# Patient Record
Sex: FEMALE | Race: BLACK OR AFRICAN AMERICAN
[De-identification: names, ages, dates, MRNs, and addresses within clinical notes are randomized per-mention and may not be internally consistent; named-entity substitution may affect disease eponyms.]

---

## 2018-05-23 ENCOUNTER — HOSPITAL ENCOUNTER (EMERGENCY)
Dept: HOSPITAL 74 - JER | Age: 23
Discharge: HOME | End: 2018-05-23
Payer: COMMERCIAL

## 2018-05-23 VITALS — HEART RATE: 119 BPM | TEMPERATURE: 98.5 F | DIASTOLIC BLOOD PRESSURE: 81 MMHG | SYSTOLIC BLOOD PRESSURE: 129 MMHG

## 2018-05-23 VITALS — BODY MASS INDEX: 39.1 KG/M2

## 2018-05-23 DIAGNOSIS — J40: Primary | ICD-10-CM

## 2018-05-23 PROCEDURE — 3E0F7GC INTRODUCTION OF OTHER THERAPEUTIC SUBSTANCE INTO RESPIRATORY TRACT, VIA NATURAL OR ARTIFICIAL OPENING: ICD-10-PCS

## 2018-05-23 RX ADMIN — IPRATROPIUM BROMIDE AND ALBUTEROL SULFATE SCH AMP: .5; 3 SOLUTION RESPIRATORY (INHALATION) at 22:40

## 2018-05-23 RX ADMIN — IPRATROPIUM BROMIDE AND ALBUTEROL SULFATE SCH AMP: .5; 3 SOLUTION RESPIRATORY (INHALATION) at 21:54

## 2018-05-23 RX ADMIN — IPRATROPIUM BROMIDE AND ALBUTEROL SULFATE SCH: .5; 3 SOLUTION RESPIRATORY (INHALATION) at 22:53

## 2018-05-23 RX ADMIN — IPRATROPIUM BROMIDE AND ALBUTEROL SULFATE SCH AMP: .5; 3 SOLUTION RESPIRATORY (INHALATION) at 22:17

## 2018-05-23 NOTE — PDOC
History of Present Illness





- General


Chief Complaint: Cold Symptoms


Stated Complaint: COUGHING/FEVER


Time Seen by Provider: 05/23/18 21:06





- History of Present Illness


Initial Comments: 


22-year-old healthy female without any significant past medical history 

presents for evaluation of sore throat cough and fever 3 days.


05/23/18 21:48








Past History





- Past Medical History


Allergies/Adverse Reactions: 


 Allergies











Allergy/AdvReac Type Severity Reaction Status Date / Time


 


No Known Allergies Allergy   Verified 05/23/18 21:13














- Suicide/Smoking/Psychosocial Hx


Smoking History: Never smoked


Have you smoked in the past 12 months: No


Information on smoking cessation initiated: No


Hx Alcohol Use: No


Drug/Substance Use Hx: No





**Review of Systems





- Review of Systems


Comments:: 


GENERAL/CONSTITUTIONAL: + fever or chills. No weakness. No weight change.


HEAD, EYES, EARS, NOSE AND THROAT: No change in vision. No ear pain or 

discharge. + sore throat.


CARDIOVASCULAR: No chest pain or shortness of breath.


RESPIRATORY: + cough, wheezing, no hemoptysis.


GASTROINTESTINAL: No nausea, vomiting, diarrhea or constipation. No rectal 

bleeding.


GENITOURINARY: No dysuria, frequency, or change in urination.


MUSCULOSKELETAL: No joint or muscle swelling or pain. No neck or back pain.


SKIN AND BREASTS: No rash or easy bruising.


NEUROLOGIC: No headache, vertigo, loss of consciousness, or loss of sensation.


PSYCHIATRIC: No depression or anxiety.


ENDOCRINE: No increased thirst. No abnormal weight change.


HEMATOLOGIC/LYMPHATIC: No anemia, easy bleeding, or history of blood clots.


ALLERGIC/IMMUNOLOGIC: No hives or skin allergy. No latex allergy.


05/23/18 21:48








*Physical Exam





- Vital Signs


 Last Vital Signs











Temp Pulse Resp BP Pulse Ox


 


 101.7 F H  92 H  16   123/80   98 


 


 05/23/18 21:08  05/23/18 21:08  05/23/18 21:08  05/23/18 21:08  05/23/18 21:08














- Physical Exam


Comments: 


GENERAL: The patient is awake, alert, and fully oriented, in no acute distress.


HEAD: Normal with no signs of trauma.


EYES: Pupils equal, round and reactive to light, extraocular movements intact, 

sclera anicteric, conjunctiva clear.


ENT: Ears normal, nares patent, oropharynx clear with mild injection.  Moist 

mucous membranes.


NECK: Normal range of motion, supple without lymphadenopathy, JVD, or masses.


LUNGS: R sided ronchi and expiratory wheezing


HEART: Regular rate and rhythm, normal S1 and S2 without murmur, rub or gallop.


ABDOMEN: Soft, nontender, normoactive bowel sounds.  No guarding, no rebound.  

No masses.


EXTREMITIES: Normal range of motion, no edema.  No clubbing or cyanosis. No 

cords, erythema, or tenderness.


NEUROLOGICAL: Cranial nerves II through XII grossly intact.  Normal speech, 

normal gait.


PSYCH: Normal mood, normal affect.


SKIN: Warm, Dry, normal turgor, no rashes or lesions noted.


05/23/18 21:49

## 2018-05-23 NOTE — PDOC
Rapid Medical Evaluation


Chief Complaint: Respiratory


Time Seen by Provider: 05/23/18 21:06


Medical Evaluation: 





05/23/18 21:07


22 year old female cough, chest pain and throat pain with fever x 2 days. 





PE: patient alert ox3, + moist cough. coarse breath sounds with occasional end 

expiratory wheeze





A: cough, fever





P: rapid strep





urine pregnancy





chest pa and lateral





fever control


05/23/18 21:13





05/23/18 21:13

## 2018-05-23 NOTE — PDOC
*Physical Exam





- Vital Signs


 Last Vital Signs











Temp Pulse Resp BP Pulse Ox


 


 101.7 F H  92 H  16   123/80   98 


 


 05/23/18 21:08  05/23/18 21:08  05/23/18 21:08  05/23/18 21:08  05/23/18 21:08














- Physical Exam


General Appearance: Yes: Appropriately Dressed


Respiratory/Chest: positive: Decreased Breath Sounds, Wheezing


Cardiovascular: positive: Tachycardia


Gastrointestinal/Abdominal: positive: Normal Bowel Sounds, Soft


Extremity: positive: Normal Capillary Refill, Normal Inspection, Normal Range 

of Motion


Integumentary: positive: Normal Color, Dry, Warm


Neurologic: positive: Fully Oriented, Alert





ED Treatment Course





- ADDITIONAL ORDERS


Additional order review: 


 Laboratory  Results











  05/23/18





  21:30


 


Urine HCG, Qual  Negative




















 05/23/18 21:12 Group A Strep Rapid Antigen - Final





 Throat 














- RADIOLOGY


Radiology Studies Ordered: 














 Category Date Time Status


 


 CHEST PA & LAT [RAD] Stat Radiology  05/23/18 21:09 Taken














- Medications


Given in the ED: 


ED Medications














Discontinued Medications














Generic Name Dose Route Start Last Admin





  Trade Name Justina  PRN Reason Stop Dose Admin


 


Albuterol/Ipratropium  1 amp  05/23/18 22:00  05/23/18 22:53





  Duoneb -  NEB  05/23/18 22:46  Not Given





  Q15M TICO   





     





     





     





     


 


Dexamethasone  10 mg  05/23/18 22:44  05/23/18 22:51





  Decadron Liquid -  PO  05/23/18 22:45  10 mg





  ONCE ONE   Administration





     





     





     





     


 


Ibuprofen  600 mg  05/23/18 21:09  05/23/18 21:54





  Motrin -  PO  05/23/18 21:10  600 mg





  ONCE ONE   Administration





     





     





     





     














Medical Decision Making





- Medical Decision Making





05/23/18 23:04


A: Bronchitis





P: Azithromycin





albuterol





*DC/Admit/Observation/Transfer


Diagnosis at time of Disposition: 


 Bronchitis








- Discharge Dispostion


Disposition: HOME





- Prescriptions


Prescriptions: 


Albuterol Sulfate Inhaler - [Ventolin HFA Inhaler -] 1 - 2 inh IH Q4H #1 inhaler


Azithromycin [Zithromax 250mg Tablets -] 250 mg PO UTDICT #6 tab





- Referrals





- Patient Instructions


Printed Discharge Instructions:  [facility] for Acute Bronchitis


Additional Instructions: 


use albuterol inhaler every4 hours as needed for cough. 





take ibuprofen every 6 hours as needed for fever








take azithromycin as prescribed





follow up with your doctor as soon as possible. 





- Post Discharge Activity


Forms/Work/School Notes:  Back to Work